# Patient Record
Sex: MALE | Race: WHITE
[De-identification: names, ages, dates, MRNs, and addresses within clinical notes are randomized per-mention and may not be internally consistent; named-entity substitution may affect disease eponyms.]

---

## 2024-04-24 PROBLEM — Z00.00 ENCOUNTER FOR PREVENTIVE HEALTH EXAMINATION: Status: ACTIVE | Noted: 2024-04-24

## 2024-04-25 ENCOUNTER — APPOINTMENT (OUTPATIENT)
Dept: ORTHOPEDIC SURGERY | Facility: CLINIC | Age: 44
End: 2024-04-25
Payer: COMMERCIAL

## 2024-04-25 PROCEDURE — 73562 X-RAY EXAM OF KNEE 3: CPT | Mod: RT

## 2024-04-25 PROCEDURE — 99203 OFFICE O/P NEW LOW 30 MIN: CPT

## 2024-06-19 ENCOUNTER — APPOINTMENT (OUTPATIENT)
Dept: ORTHOPEDIC SURGERY | Facility: CLINIC | Age: 44
End: 2024-06-19
Payer: COMMERCIAL

## 2024-06-19 DIAGNOSIS — M25.561 PAIN IN RIGHT KNEE: ICD-10-CM

## 2024-06-19 DIAGNOSIS — M19.071 PRIMARY OSTEOARTHRITIS, RIGHT ANKLE AND FOOT: ICD-10-CM

## 2024-06-19 DIAGNOSIS — Z86.69 PERSONAL HISTORY OF OTHER DISEASES OF THE NERVOUS SYSTEM AND SENSE ORGANS: ICD-10-CM

## 2024-06-19 PROCEDURE — 99203 OFFICE O/P NEW LOW 30 MIN: CPT

## 2024-06-20 PROBLEM — M19.071 ARTHRITIS OF RIGHT ANKLE: Status: ACTIVE | Noted: 2024-06-20

## 2024-06-20 PROBLEM — Z86.69 HISTORY OF CEREBRAL PALSY: Status: ACTIVE | Noted: 2024-06-20

## 2024-06-20 PROBLEM — M25.561 ACUTE PAIN OF RIGHT KNEE: Status: ACTIVE | Noted: 2024-04-25

## 2024-06-20 NOTE — DISCUSSION/SUMMARY
[de-identified] : Treatment plan as discussed:  Script for physical therapy provided for improved ROM and strengthening of surrounding musculature. Benefits discussed.  I recommended anti-inflammatory medication. Patient agrees to taking Advil/Ibuprofen OTC as needed for pain. Benefits discussed. Confirmed no contraindication to NSAIDs.  I recommended patient rest, ice, compress, and elevate the knee regularly. Encouraged activity modification as tolerable. Encouraged gentle range of motion to avoid stiffness. No gym /sports at least until further evaluation.  All questions and concerns addressed to patient's satisfaction. Patient expresses full understanding of treatment plan. Patient will follow up in 4-6 weeks with Dr. Ashby per request for repeat evaluation and treatment.

## 2024-06-20 NOTE — HISTORY OF PRESENT ILLNESS
[de-identified] : 44-year-old male here for evaluation of right knee pain. Patient reports an aching nontraumatic pain that which worsens upon going from standing to standing position going up and down stairs.  Patient does have cerebral palsy.  Due to this, he reports he does fall often and bangs his right knee.  He does report some popping and clicking upon ambulation.  He takes Advil over-the-counter with relief.  Able to bear weight and ambulate however experiences pain with doing so.  Currently ambulates with a cane.

## 2024-06-20 NOTE — ASSESSMENT
[FreeTextEntry1] : Knee is getting better but he is concerned about the position of his foot made recommendation to see Dr. Javier if there is any corrections that could be made I do not believe orthotics would help him return on the knee as needed

## 2024-06-20 NOTE — REASON FOR VISIT
[FreeTextEntry2] : Right knee pain Did some therapy help the knee taking some Advil uses a cane weight is stable 118 pounds did have surgery for CP by Dr. Jose Crawford over 25 years ago

## 2024-06-20 NOTE — IMAGING
[de-identified] : Physical examination of the right knee: Mild swelling appreciated laterally greater than medially.  No ecchymosis or erythema appreciated.  Skin is intact.  Patient mildly tender to palpation along the medial and lateral joint lines.  Positive patellar grind.  Calf is soft and nontender.  Nontender over the patella tendon or quad tendon.  Able to straight leg raise off the table.  Range of motion limited secondary to pain and swelling.  Negative Lakesha's.  Negative Lachman's.  Sensorimotor intact distally.  Neurovascular intact.  Antalgic gait.  X-rays of the right knee taken in the office today: High riding patella.  No acute fractures, subluxations, or dislocations. Right foot and ankle fixed hindfoot some external rotation mild planovalgus alignment

## 2024-06-26 ENCOUNTER — APPOINTMENT (OUTPATIENT)
Dept: ORTHOPEDIC SURGERY | Facility: CLINIC | Age: 44
End: 2024-06-26
Payer: COMMERCIAL

## 2024-06-26 DIAGNOSIS — M21.41 FLAT FOOT [PES PLANUS] (ACQUIRED), RIGHT FOOT: ICD-10-CM

## 2024-06-26 DIAGNOSIS — M21.42 FLAT FOOT [PES PLANUS] (ACQUIRED), LEFT FOOT: ICD-10-CM

## 2024-06-26 PROCEDURE — 73610 X-RAY EXAM OF ANKLE: CPT | Mod: RT

## 2024-06-26 PROCEDURE — 99213 OFFICE O/P EST LOW 20 MIN: CPT

## 2024-06-26 PROCEDURE — 73630 X-RAY EXAM OF FOOT: CPT | Mod: RT

## 2024-06-26 PROCEDURE — 99203 OFFICE O/P NEW LOW 30 MIN: CPT

## 2024-10-24 ENCOUNTER — APPOINTMENT (OUTPATIENT)
Dept: ORTHOPEDIC SURGERY | Facility: CLINIC | Age: 44
End: 2024-10-24